# Patient Record
Sex: MALE | Race: WHITE | NOT HISPANIC OR LATINO | Employment: FULL TIME | URBAN - METROPOLITAN AREA
[De-identification: names, ages, dates, MRNs, and addresses within clinical notes are randomized per-mention and may not be internally consistent; named-entity substitution may affect disease eponyms.]

---

## 2019-04-29 ENCOUNTER — APPOINTMENT (OUTPATIENT)
Dept: RADIOLOGY | Facility: IMAGING CENTER | Age: 49
End: 2019-04-29
Attending: PHYSICIAN ASSISTANT
Payer: COMMERCIAL

## 2019-04-29 ENCOUNTER — OFFICE VISIT (OUTPATIENT)
Dept: URGENT CARE | Facility: PHYSICIAN GROUP | Age: 49
End: 2019-04-29
Payer: COMMERCIAL

## 2019-04-29 VITALS
RESPIRATION RATE: 18 BRPM | HEART RATE: 80 BPM | WEIGHT: 188 LBS | OXYGEN SATURATION: 90 % | DIASTOLIC BLOOD PRESSURE: 82 MMHG | HEIGHT: 67 IN | SYSTOLIC BLOOD PRESSURE: 130 MMHG | BODY MASS INDEX: 29.51 KG/M2 | TEMPERATURE: 98.3 F

## 2019-04-29 DIAGNOSIS — R06.2 WHEEZING: ICD-10-CM

## 2019-04-29 DIAGNOSIS — J22 LOWER RESP. TRACT INFECTION: ICD-10-CM

## 2019-04-29 DIAGNOSIS — R06.02 SOB (SHORTNESS OF BREATH): ICD-10-CM

## 2019-04-29 DIAGNOSIS — R79.81 LOW OXYGEN SATURATION: ICD-10-CM

## 2019-04-29 PROCEDURE — 99204 OFFICE O/P NEW MOD 45 MIN: CPT | Performed by: PHYSICIAN ASSISTANT

## 2019-04-29 PROCEDURE — 71046 X-RAY EXAM CHEST 2 VIEWS: CPT | Mod: TC,FY | Performed by: PHYSICIAN ASSISTANT

## 2019-04-29 RX ORDER — IPRATROPIUM BROMIDE AND ALBUTEROL SULFATE 2.5; .5 MG/3ML; MG/3ML
3 SOLUTION RESPIRATORY (INHALATION) ONCE
Status: COMPLETED | OUTPATIENT
Start: 2019-04-29 | End: 2019-04-29

## 2019-04-29 RX ORDER — PREDNISONE 10 MG/1
TABLET ORAL
Qty: 1 QUANTITY SUFFICIENT | Refills: 0 | Status: SHIPPED | OUTPATIENT
Start: 2019-04-29 | End: 2021-03-02

## 2019-04-29 RX ADMIN — IPRATROPIUM BROMIDE AND ALBUTEROL SULFATE 3 ML: 2.5; .5 SOLUTION RESPIRATORY (INHALATION) at 12:05

## 2019-04-29 NOTE — LETTER
April 29, 2019         Patient: Hasmukh Keating   YOB: 1970   Date of Visit: 4/29/2019           To Whom it May Concern:    Hasmukh Keating was seen in my clinic on 4/29/2019. He may return to work on 4/30/19.    If you have any questions or concerns, please don't hesitate to call.        Sincerely,           Tere Biggs P.A.-C.  Electronically Signed

## 2019-04-29 NOTE — PROGRESS NOTES
Chief Complaint   Patient presents with   • Cough     x 8 days   • Congestion       HISTORY OF PRESENT ILLNESS: Patient is a 48 y.o. male who presents today for the following:    Patient comes in for evaluation of cough and shortness of breath that started about 10 days ago.  He reports mild nasal congestion but his biggest complaint is difficulty breathing.  He denies history of asthma or COPD.  He has been smoking a pack a day for most of his adult life and quit about a month ago.  He denies fever, night was, chills, body aches.  He does not use any prescription medication.  He does not currently have a primary care provider.    There are no active problems to display for this patient.      Allergies:Patient has no known allergies.    Current Outpatient Prescriptions Ordered in [facility]   Medication Sig Dispense Refill   • predniSONE (DELTASONE) 10 MG Tab 50 mg x 1 day; 40 mg x 1 day; 30 mg x 1 day; 20 mg x 1 day; 10 mg x 1 day 1 Quantity Sufficient 0     No current Epic-ordered facility-administered medications on file.        No past medical history on file.    Social History   Substance Use Topics   • Smoking status: Former Smoker     Packs/day: 1.00     Types: Cigarettes     Quit date: 1930   • Smokeless tobacco: Never Used   • Alcohol use No      Comment: occ       No family status information on file.   No family history on file.    Review of Systems:   Constitutional ROS: No unexpected change in weight, No weakness, No fatigue  Eye ROS: No recent significant change in vision, No eye pain, redness, discharge  Ear ROS: No drainage, No tinnitus or vertigo, No recent change in hearing  Mouth/Throat ROS: No teeth or gum problems, No bleeding gums, No tongue complaints  Neck ROS: No swollen glands, No significant pain in neck  Pulmonary ROS: Positive for shortness of breath.  Cardiovascular ROS: No diaphoresis, No edema, No palpitations  Gastrointestinal ROS: No change in bowel habits, No significant change in  "appetite, No nausea, vomiting, diarrhea, or constipation  Musculoskeletal/Extremities ROS: No peripheral edema, No pain, redness or swelling on the joints  Hematologic/Lymphatic ROS: No chills, No night sweats, No weight loss  Skin/Integumentary ROS: No edema, No evidence of rash, No itching      Exam:  /82   Pulse 80   Temp 36.8 °C (98.3 °F) (Temporal)   Resp 18   Ht 1.702 m (5' 7\")   Wt 85.3 kg (188 lb)   SpO2 90%   General: Well developed, well nourished. No distress.  Eye: PERRL/EOMI; conjunctivae clear, lids normal.  ENMT: Lips without lesions, MMM. Oropharynx is clear. Bilateral TMs are within normal limits.  Pulmonary: Unlabored respiratory effort.  Decreased breath sounds throughout with faint inspiratory and expiratory wheezes.  Cardiovascular: Regular rate and rhythm without murmur.    Neurologic: Grossly nonfocal. No facial asymmetry noted.  Lymph: No cervical lymphadenopathy noted.  Skin: Warm, dry, good turgor. No rashes in visible areas.   Psych: Normal mood. Alert and oriented x3. Judgment and insight is normal.    DuoNeb x1: Patient is feeling a little bit better post treatment.  Breath sounds have increased but continues to have expiratory and expiratory wheezes.  Oxygenating at 93% on room air.    Chest x-ray, per radiology:  Impression       1.  There is mild central bronchial wall thickening which can be associated with underlying asthma or bronchitis.  2.  There is no evidence of focal pneumonia or edema.     Assessment/Plan:  Discussed likely viral etiology versus seasonal allergies.  No signs of pneumonia on x-ray.  Use all medication as directed.  Follow up for worsening or persistent symptoms.  1. Lower resp. tract infection     2. SOB (shortness of breath)  DX-CHEST-2 VIEWS    ipratropium-albuterol (DUONEB) nebulizer solution    predniSONE (DELTASONE) 10 MG Tab   3. Low oxygen saturation  DX-CHEST-2 VIEWS    ipratropium-albuterol (DUONEB) nebulizer solution    predniSONE " (DELTASONE) 10 MG Tab   4. Wheezing  ipratropium-albuterol (DUONEB) nebulizer solution    predniSONE (DELTASONE) 10 MG Tab

## 2020-08-12 ENCOUNTER — OFFICE VISIT (OUTPATIENT)
Dept: URGENT CARE | Facility: PHYSICIAN GROUP | Age: 50
End: 2020-08-12
Payer: COMMERCIAL

## 2020-08-12 VITALS
TEMPERATURE: 97.7 F | RESPIRATION RATE: 16 BRPM | HEART RATE: 84 BPM | BODY MASS INDEX: 30.23 KG/M2 | SYSTOLIC BLOOD PRESSURE: 140 MMHG | WEIGHT: 193 LBS | OXYGEN SATURATION: 94 % | DIASTOLIC BLOOD PRESSURE: 80 MMHG

## 2020-08-12 DIAGNOSIS — B02.9 HERPES ZOSTER WITHOUT COMPLICATION: ICD-10-CM

## 2020-08-12 DIAGNOSIS — R10.9 LEFT FLANK PAIN: ICD-10-CM

## 2020-08-12 LAB
APPEARANCE UR: NORMAL
BILIRUB UR STRIP-MCNC: NORMAL MG/DL
COLOR UR AUTO: YELLOW
GLUCOSE UR STRIP.AUTO-MCNC: NORMAL MG/DL
KETONES UR STRIP.AUTO-MCNC: NORMAL MG/DL
LEUKOCYTE ESTERASE UR QL STRIP.AUTO: NORMAL
NITRITE UR QL STRIP.AUTO: NORMAL
PH UR STRIP.AUTO: 7 [PH] (ref 5–8)
PROT UR QL STRIP: NORMAL MG/DL
RBC UR QL AUTO: NORMAL
SP GR UR STRIP.AUTO: 1.02
UROBILINOGEN UR STRIP-MCNC: 0.2 MG/DL

## 2020-08-12 PROCEDURE — 99214 OFFICE O/P EST MOD 30 MIN: CPT | Performed by: PHYSICIAN ASSISTANT

## 2020-08-12 PROCEDURE — 81002 URINALYSIS NONAUTO W/O SCOPE: CPT | Performed by: PHYSICIAN ASSISTANT

## 2020-08-12 RX ORDER — VALACYCLOVIR HYDROCHLORIDE 1 G/1
1000 TABLET, FILM COATED ORAL 3 TIMES DAILY
Qty: 21 TAB | Refills: 0 | Status: SHIPPED | OUTPATIENT
Start: 2020-08-12 | End: 2020-08-19

## 2020-08-12 ASSESSMENT — ENCOUNTER SYMPTOMS
SHORTNESS OF BREATH: 0
FEVER: 0
COUGH: 0
VOMITING: 0
FATIGUE: 0
RHINORRHEA: 0
BACK PAIN: 1
CHILLS: 0
SORE THROAT: 0
DIARRHEA: 0

## 2020-08-12 NOTE — PROGRESS NOTES
"Subjective:      Hasmukh Keating is a 50 y.o. male who presents with Flank Pain (L side pain x6d )            Patient is a pleasant 50-year-old male who presents to urgent care with left flank pain for the last 5 to 6 days.  He reports that the pain began to his left side with radiation to his back however will localizes more to his back.  He denies specific fall, trauma or injury, urinary symptoms, nausea vomiting or diarrhea.  He then became more concerned when he noticed rash developed to his back that was painful and slightly itchy in nature.  He believes this to be \"mosquito bites \"however this is more painful than such. He has not taken anything for his symptoms.   He does have hx of chickenpox in the past.     Rash  This is a new problem. Episode onset: 2-3 days ago. The problem is unchanged. Location: Left flank. The rash is characterized by blistering, pain and redness. He was exposed to nothing. Pertinent negatives include no congestion, cough, diarrhea, fatigue, fever, rhinorrhea, shortness of breath, sore throat or vomiting. Past treatments include nothing.       Review of Systems   Constitutional: Negative for chills, fatigue, fever and malaise/fatigue.   HENT: Negative for congestion, rhinorrhea and sore throat.    Respiratory: Negative for cough and shortness of breath.    Gastrointestinal: Negative for diarrhea and vomiting.   Musculoskeletal: Positive for back pain.   Skin: Positive for itching and rash.   All other systems reviewed and are negative.         Objective:     /80   Pulse 84   Temp 36.5 °C (97.7 °F) (Temporal)   Resp 16   Wt 87.5 kg (193 lb)   SpO2 94%   BMI 30.23 kg/m²    PMH:  has no past medical history on file.  MEDS: Reviewed .   ALLERGIES: No Known Allergies  SURGHX:   Past Surgical History:   Procedure Laterality Date   • STUMP REVISION  6/21/2013    Performed by Shaji Moore M.D. at SURGERY [facility]     SOCHX:  reports that he quit smoking about 90 years " ago. His smoking use included cigarettes. He smoked 1.00 pack per day. He has never used smokeless tobacco. He reports that he does not drink alcohol or use drugs.  FH: Family history was reviewed, no pertinent findings to report    Physical Exam  Vitals signs reviewed.   Constitutional:       General: He is not in acute distress.     Appearance: He is well-developed.   HENT:      Head: Normocephalic and atraumatic.      Right Ear: External ear normal.      Left Ear: External ear normal.      Mouth/Throat:      Pharynx: No oropharyngeal exudate.   Eyes:      Conjunctiva/sclera: Conjunctivae normal.      Pupils: Pupils are equal, round, and reactive to light.   Neck:      Musculoskeletal: Normal range of motion and neck supple.      Trachea: No tracheal deviation.   Cardiovascular:      Rate and Rhythm: Normal rate and regular rhythm.      Heart sounds: No murmur.   Pulmonary:      Effort: Pulmonary effort is normal. No respiratory distress.      Breath sounds: Normal breath sounds.   Abdominal:      Tenderness: There is no abdominal tenderness.   Musculoskeletal: Normal range of motion.   Skin:     General: Skin is warm.      Findings: Rash present.             Comments: 3 discrete patches to left back/flank- erythematous and vesicular in nature- tenderness noted.      Neurological:      Mental Status: He is alert and oriented to person, place, and time.      Coordination: Coordination normal.   Psychiatric:         Behavior: Behavior normal.         Thought Content: Thought content normal.         Judgment: Judgment normal.                 Assessment/Plan:        1. Herpes zoster without complication  - valacyclovir (VALTREX) 1 GM Tab; Take 1 Tab by mouth 3 times a day for 7 days.  Dispense: 21 Tab; Refill: 0    2. Left flank pain  - POCT Urinalysis    UA- clear.   Pt is with classic zoster rash- with associated dermatomal pain.   Will start the patient on the above at this time as patient would like to trial such.   Discussed the contagious nature of symptoms today in certain populations to avoid.  He is to keep the area clean and dry.  Patient and/or guardian given precautionary s/sx that mandate immediate follow up and evaluation in the ED. Advised of risks of not doing so.  Side effects of the above medications discussed.   DDX, Supportive care, and indications for immediate follow-up discussed with patient.    Instructed to return to clinic or nearest emergency department if we are not available for any change in condition, further concerns, or worsening of symptoms.    The patient and/or guardian demonstrated a good understanding and agreed with the treatment plan.    Please note that this dictation was created using voice recognition software. I have made every reasonable attempt to correct obvious errors, but I expect that there are errors of grammar and possibly content that I did not discover before finalizing the note.

## 2021-03-02 ENCOUNTER — OFFICE VISIT (OUTPATIENT)
Dept: URGENT CARE | Facility: PHYSICIAN GROUP | Age: 51
End: 2021-03-02

## 2021-03-02 VITALS
BODY MASS INDEX: 29.82 KG/M2 | OXYGEN SATURATION: 94 % | RESPIRATION RATE: 12 BRPM | WEIGHT: 190 LBS | HEIGHT: 67 IN | DIASTOLIC BLOOD PRESSURE: 90 MMHG | HEART RATE: 77 BPM | SYSTOLIC BLOOD PRESSURE: 152 MMHG | TEMPERATURE: 98.4 F

## 2021-03-02 DIAGNOSIS — Z02.4 ENCOUNTER FOR CDL (COMMERCIAL DRIVING LICENSE) EXAM: ICD-10-CM

## 2021-03-02 PROCEDURE — 7100 PR DOT PHYSICAL: Performed by: PHYSICIAN ASSISTANT

## 2021-03-03 NOTE — PROGRESS NOTES
Patient is here for a CDL exam.  Patient takes no prescription medication.  Patient denies chest pain, shortness of breath, orthopnea, PND, leg swelling.  All paperwork was reviewed.  Exam is normal.  Cleared for 1 year due to elevated blood pressure.  Patient has no history of hypertension.  Discussed lifestyle changes that could improve his blood pressure.

## 2022-03-24 ENCOUNTER — APPOINTMENT (OUTPATIENT)
Dept: RADIOLOGY | Facility: IMAGING CENTER | Age: 52
End: 2022-03-24
Attending: FAMILY MEDICINE
Payer: COMMERCIAL

## 2022-03-24 ENCOUNTER — OFFICE VISIT (OUTPATIENT)
Dept: URGENT CARE | Facility: PHYSICIAN GROUP | Age: 52
End: 2022-03-24
Payer: COMMERCIAL

## 2022-03-24 VITALS
TEMPERATURE: 97.8 F | DIASTOLIC BLOOD PRESSURE: 64 MMHG | RESPIRATION RATE: 16 BRPM | OXYGEN SATURATION: 94 % | HEIGHT: 67 IN | HEART RATE: 90 BPM | SYSTOLIC BLOOD PRESSURE: 152 MMHG | WEIGHT: 189 LBS | BODY MASS INDEX: 29.66 KG/M2

## 2022-03-24 DIAGNOSIS — S20.211A CONTUSION OF RIGHT CHEST WALL, INITIAL ENCOUNTER: ICD-10-CM

## 2022-03-24 PROCEDURE — 99213 OFFICE O/P EST LOW 20 MIN: CPT | Performed by: FAMILY MEDICINE

## 2022-03-24 PROCEDURE — 71101 X-RAY EXAM UNILAT RIBS/CHEST: CPT | Mod: TC,FY,RT | Performed by: RADIOLOGY

## 2022-03-24 RX ORDER — NAPROXEN 500 MG/1
500 TABLET ORAL 2 TIMES DAILY WITH MEALS
Qty: 20 TABLET | Refills: 0 | Status: SHIPPED | OUTPATIENT
Start: 2022-03-24 | End: 2022-04-03

## 2022-03-24 ASSESSMENT — ENCOUNTER SYMPTOMS
NAUSEA: 0
MYALGIAS: 0
FEVER: 0
VOMITING: 0
COUGH: 0
SHORTNESS OF BREATH: 0
DIZZINESS: 0
CHILLS: 0
SORE THROAT: 0

## 2022-03-24 ASSESSMENT — PAIN SCALES - GENERAL: PAINLEVEL: 4=SLIGHT-MODERATE PAIN

## 2022-03-24 NOTE — PROGRESS NOTES
Subjective:   Hasmukh Keating is a 51 y.o. male who presents for Rib Injury (R side, x4days )        Chest Pain   Chronicity: Reports right lateral chest wall pain, onset 4 days prior after striking the right ribs and chest wall on a gearshift. The current episode started in the past 7 days (4 days prior). The onset quality is sudden. The problem occurs constantly. The problem has been unchanged. The pain is moderate. The quality of the pain is described as sharp. Pertinent negatives include no cough, dizziness, fever, nausea, shortness of breath or vomiting. Associated symptoms comments: Reports exacerbation of pain with deep inspiration and direct palpation. He has tried rest for the symptoms. The treatment provided no relief.     PMH:  has no past medical history on file.  MEDS:   Current Outpatient Medications:   •  naproxen (NAPROSYN) 500 MG Tab, Take 1 Tablet by mouth 2 times a day with meals for 10 days., Disp: 20 Tablet, Rfl: 0  ALLERGIES: No Known Allergies  SURGHX:   Past Surgical History:   Procedure Laterality Date   • STUMP REVISION  6/21/2013    Performed by Shaji Moore M.D. at SURGERY [facility]     SOCHX:  reports that he quit smoking about 92 years ago. His smoking use included cigarettes. He smoked 1.00 pack per day. He has never used smokeless tobacco. He reports that he does not drink alcohol and does not use drugs.  FH: History reviewed. No pertinent family history.  Review of Systems   Constitutional: Negative for chills and fever.   HENT: Negative for sore throat.    Respiratory: Negative for cough and shortness of breath.    Cardiovascular: Positive for chest pain (Right chest wall pain).   Gastrointestinal: Negative for nausea and vomiting.   Musculoskeletal: Negative for myalgias.   Skin: Negative for rash.   Neurological: Negative for dizziness.   All other systems reviewed and are negative.       Objective:   /64   Pulse 90   Temp 36.6 °C (97.8 °F) (Temporal)   Resp  "16   Ht 1.702 m (5' 7\")   Wt 85.7 kg (189 lb)   SpO2 94%   BMI 29.60 kg/m²   Physical Exam  Vitals and nursing note reviewed.   Constitutional:       General: He is not in acute distress.     Appearance: He is well-developed.   HENT:      Head: Normocephalic and atraumatic.      Right Ear: External ear normal.      Left Ear: External ear normal.      Nose: Nose normal.      Mouth/Throat:      Mouth: Mucous membranes are moist.   Eyes:      Conjunctiva/sclera: Conjunctivae normal.   Cardiovascular:      Rate and Rhythm: Normal rate.   Pulmonary:      Effort: Pulmonary effort is normal. No respiratory distress.      Breath sounds: Wheezing (Mild expiratory) present. No rhonchi.   Chest:       Abdominal:      General: There is no distension.   Musculoskeletal:         General: Normal range of motion.   Skin:     General: Skin is warm and dry.   Neurological:      General: No focal deficit present.      Mental Status: He is alert and oriented to person, place, and time. Mental status is at baseline.      Gait: Gait (gait at baseline) normal.   Psychiatric:         Judgment: Judgment normal.     [id number] (WITH 1-VIEW CXR) RIGHT  Order: [id number]   Status: Final result     Visible to patient: No (scheduled for 3/25/2022  1:20 PM)     Dx: Contusion of right chest wall, initia...     0 Result Notes    Details    Reading Physician Reading Date Result Priority   Eve Wang M.D.  [phone] 3/24/2022 Urgent Care     Narrative & Impression     3/24/2022 3:02 PM     HISTORY/REASON FOR EXAM:  Right lateral upper mid rib pain after injury 2 days ago.        TECHNIQUE/EXAM DESCRIPTION AND NUMBER OF VIEWS:  One view of the chest and 5 images of the right ribs.     COMPARISON: CXR 4/29/2019     FINDINGS:  No displaced fractures or acute bony changes are noted.     There is no pneumothorax or hemothorax.        IMPRESSION:     1.  Unremarkable right rib series.             Exam Ended: 03/24/22  3:16 PM Last " Resulted: 03/24/22  3:18 PM                 Assessment/Plan:   1. Contusion of right chest wall, initial encounter  - [id number] (WITH 1-VIEW CXR) RIGHT; Future  - naproxen (NAPROSYN) 500 MG Tab; Take 1 Tablet by mouth 2 times a day with meals for 10 days.  Dispense: 20 Tablet; Refill: 0        Medical Decision Making/Course:  In the course of preparing for this visit with review of the pertinent past medical history, recent and past clinic visits, current medications, and performing chart, immunization, medical history and medication reconciliation, and in the further course of obtaining the current history pertinent to the clinic visit today, performing an exam and evaluation, ordering and independently evaluating labs, tests including interpretation of XRAY imaging , and/or procedures, prescribing any recommended new medications as noted above, providing any pertinent counseling and education and recommending further coordination of care, at least  30 minutes of total time were spent during this encounter.      Discussed close monitoring, return precautions, and supportive measures of maintaining adequate fluid hydration and caloric intake, relative rest and symptom management as needed for pain and/or fever.    Differential diagnosis, natural history, supportive care, and indications for immediate follow-up discussed.     Advised the patient to follow-up with the primary care physician for recheck, reevaluation, and consideration of further management.    Please note that this dictation was created using voice recognition software. I have worked with consultants from the vendor as well as technical experts from [email or web address] [employer] to optimize the interface. I have made every reasonable attempt to correct obvious errors, but I expect that there are errors of grammar and possibly content that I did not discover before finalizing the note.

## 2022-12-14 ENCOUNTER — OFFICE VISIT (OUTPATIENT)
Dept: URGENT CARE | Facility: PHYSICIAN GROUP | Age: 52
End: 2022-12-14
Payer: COMMERCIAL

## 2022-12-14 VITALS
TEMPERATURE: 97.6 F | RESPIRATION RATE: 18 BRPM | WEIGHT: 176.6 LBS | SYSTOLIC BLOOD PRESSURE: 132 MMHG | DIASTOLIC BLOOD PRESSURE: 92 MMHG | HEIGHT: 67 IN | OXYGEN SATURATION: 94 % | BODY MASS INDEX: 27.72 KG/M2 | HEART RATE: 78 BPM

## 2022-12-14 DIAGNOSIS — J02.9 PHARYNGITIS, UNSPECIFIED ETIOLOGY: ICD-10-CM

## 2022-12-14 DIAGNOSIS — J22 LRTI (LOWER RESPIRATORY TRACT INFECTION): ICD-10-CM

## 2022-12-14 DIAGNOSIS — R06.02 SHORTNESS OF BREATH: ICD-10-CM

## 2022-12-14 DIAGNOSIS — R06.2 WHEEZING: ICD-10-CM

## 2022-12-14 LAB
EXTERNAL QUALITY CONTROL: NORMAL
FLUAV+FLUBV AG SPEC QL IA: NEGATIVE
INT CON NEG: NORMAL
INT CON POS: NORMAL
S PYO AG THROAT QL: NEGATIVE
SARS-COV+SARS-COV-2 AG RESP QL IA.RAPID: NEGATIVE

## 2022-12-14 PROCEDURE — 87804 INFLUENZA ASSAY W/OPTIC: CPT | Performed by: NURSE PRACTITIONER

## 2022-12-14 PROCEDURE — 87426 SARSCOV CORONAVIRUS AG IA: CPT | Performed by: NURSE PRACTITIONER

## 2022-12-14 PROCEDURE — 94640 AIRWAY INHALATION TREATMENT: CPT | Performed by: NURSE PRACTITIONER

## 2022-12-14 PROCEDURE — 87880 STREP A ASSAY W/OPTIC: CPT | Performed by: NURSE PRACTITIONER

## 2022-12-14 PROCEDURE — 99213 OFFICE O/P EST LOW 20 MIN: CPT | Mod: 25 | Performed by: NURSE PRACTITIONER

## 2022-12-14 RX ORDER — PREDNISONE 20 MG/1
20 TABLET ORAL 2 TIMES DAILY
Qty: 10 TABLET | Refills: 0 | Status: SHIPPED | OUTPATIENT
Start: 2022-12-14 | End: 2022-12-19

## 2022-12-14 RX ORDER — ALBUTEROL SULFATE 90 UG/1
2 AEROSOL, METERED RESPIRATORY (INHALATION) EVERY 4 HOURS PRN
Qty: 1 EACH | Refills: 0 | Status: SHIPPED | OUTPATIENT
Start: 2022-12-14 | End: 2022-12-28

## 2022-12-14 RX ORDER — IPRATROPIUM BROMIDE AND ALBUTEROL SULFATE 2.5; .5 MG/3ML; MG/3ML
3 SOLUTION RESPIRATORY (INHALATION) ONCE
Status: COMPLETED | OUTPATIENT
Start: 2022-12-14 | End: 2022-12-14

## 2022-12-14 RX ORDER — DOXYCYCLINE HYCLATE 100 MG
100 TABLET ORAL 2 TIMES DAILY
Qty: 14 TABLET | Refills: 0 | Status: SHIPPED | OUTPATIENT
Start: 2022-12-14 | End: 2022-12-21

## 2022-12-14 RX ADMIN — IPRATROPIUM BROMIDE AND ALBUTEROL SULFATE 3 ML: 2.5; .5 SOLUTION RESPIRATORY (INHALATION) at 18:41

## 2022-12-14 ASSESSMENT — ENCOUNTER SYMPTOMS
WHEEZING: 1
ABDOMINAL PAIN: 0
SPUTUM PRODUCTION: 1
NAUSEA: 0
COUGH: 1
DIARRHEA: 0
SHORTNESS OF BREATH: 1
VOMITING: 0
HEADACHES: 1

## 2022-12-14 ASSESSMENT — COPD QUESTIONNAIRES: COPD: 0

## 2022-12-15 NOTE — PROGRESS NOTES
"Subjective:     Hasmukh Keating is a 52 y.o. male who presents for Shortness of Breath (X 5-6 days), Cough (X 12 days), and Headache      Shortness of Breath  This is a new problem. Episode onset: Hasmukh is a pleasant 52 year old male who presents to  today with complaints of cough and congestion for the past 12 days. In the past 5 days he has been experiencing shortness of breath. Associated symptoms include headaches, sputum production and wheezing. Pertinent negatives include no abdominal pain or vomiting. Treatments tried: Dayquil/theraflu. There is no history of asthma, COPD or pneumonia.   Headache      Review of Systems   Constitutional:  Positive for malaise/fatigue.   HENT:  Positive for congestion.    Respiratory:  Positive for cough, sputum production, shortness of breath and wheezing.    Gastrointestinal:  Negative for abdominal pain, diarrhea, nausea and vomiting.   Neurological:  Positive for headaches.     PMH: No past medical history on file.  ALLERGIES: No Known Allergies  SURGHX:   Past Surgical History:   Procedure Laterality Date    STUMP REVISION  6/21/2013    Performed by Shaji Moore M.D. at SURGERY [facility]     SOCHX:   Social History     Socioeconomic History    Marital status: Single   Tobacco Use    Smoking status: Former     Packs/day: 1.00     Types: Cigarettes    Smokeless tobacco: Never   Substance and Sexual Activity    Alcohol use: No     Comment: occ    Drug use: No     FH: No family history on file.      Objective:   BP (!) 132/92   Pulse 78   Temp 36.4 °C (97.6 °F) (Temporal)   Resp 18   Ht 1.702 m (5' 7\")   Wt 80.1 kg (176 lb 9.6 oz)   SpO2 94%   BMI 27.66 kg/m²     Physical Exam  Vitals and nursing note reviewed.   Constitutional:       General: He is not in acute distress.     Appearance: Normal appearance. He is obese. He is not ill-appearing.   HENT:      Head: Normocephalic and atraumatic.      Right Ear: Tympanic membrane, ear canal and external ear " normal. There is no impacted cerumen.      Left Ear: Tympanic membrane, ear canal and external ear normal. There is no impacted cerumen.      Nose: Congestion present. No rhinorrhea.      Mouth/Throat:      Mouth: Mucous membranes are moist.      Pharynx: Posterior oropharyngeal erythema present. No oropharyngeal exudate.   Eyes:      Extraocular Movements: Extraocular movements intact.      Pupils: Pupils are equal, round, and reactive to light.   Cardiovascular:      Rate and Rhythm: Normal rate and regular rhythm.      Pulses: Normal pulses.      Heart sounds: Normal heart sounds.   Pulmonary:      Effort: Pulmonary effort is normal.      Breath sounds: Examination of the right-upper field reveals wheezing. Examination of the left-upper field reveals wheezing. Examination of the right-middle field reveals wheezing. Examination of the left-middle field reveals wheezing. Examination of the right-lower field reveals wheezing. Examination of the left-lower field reveals wheezing. Wheezing present. No decreased breath sounds.      Comments: Duoneb treatment given in clinic. His lungs remain wheezing on auscultation following treatment.   Abdominal:      General: Abdomen is flat. Bowel sounds are normal.      Palpations: Abdomen is soft.      Tenderness: There is no abdominal tenderness. There is no right CVA tenderness or left CVA tenderness.   Musculoskeletal:         General: Normal range of motion.      Cervical back: Normal range of motion and neck supple.   Skin:     General: Skin is warm and dry.      Capillary Refill: Capillary refill takes less than 2 seconds.   Neurological:      General: No focal deficit present.      Mental Status: He is alert and oriented to person, place, and time. Mental status is at baseline.   Psychiatric:         Mood and Affect: Mood normal.         Behavior: Behavior normal.         Thought Content: Thought content normal.         Judgment: Judgment normal.     Results for orders  placed or performed in visit on 12/14/22   POCT Rapid Strep A   Result Value Ref Range    Rapid Strep Screen negative     Internal Control Positive Valid     Internal Control Negative Valid    POCT SARS-COV Antigen DASIA (Symptomatic only)   Result Value Ref Range    Internal  Valid     SARS-COV ANTIGEN DASIA Negative Negative, Indeterminate, None Detected, Not Detected, Detected, NotDetected, Valid, Invalid, Pass    Internal Control Positive Valid     Internal Control Negative Valid    POCT Influenza A/B   Result Value Ref Range    Rapid Influenza A-B negative     Internal Control Positive Valid     Internal Control Negative Valid        Assessment/Plan:   Assessment    1. Shortness of breath  ipratropium-albuterol (DUONEB) nebulizer solution    albuterol 108 (90 Base) MCG/ACT Aero Soln inhalation aerosol    POCT SARS-COV Antigen DASIA (Symptomatic only)    POCT Influenza A/B      2. LRTI (lower respiratory tract infection)  ipratropium-albuterol (DUONEB) nebulizer solution    albuterol 108 (90 Base) MCG/ACT Aero Soln inhalation aerosol    predniSONE (DELTASONE) 20 MG Tab    doxycycline (VIBRAMYCIN) 100 MG Tab    POCT SARS-COV Antigen DASIA (Symptomatic only)    POCT Influenza A/B      3. Wheezing  albuterol 108 (90 Base) MCG/ACT Aero Soln inhalation aerosol    predniSONE (DELTASONE) 20 MG Tab    POCT SARS-COV Antigen DASIA (Symptomatic only)    POCT Influenza A/B      4. Pharyngitis, unspecified etiology  POCT Rapid Strep A      Prednisone, Doxycycline and albuterol to be taken as directed.   We discussed supportive measures including humidifier, warm salt water gargles, over-the-counter Cepacol throat lozenges, rest  and increased fluids. Pt was encouraged to seek treatment back in the ER or urgent care for worsening symptoms,  fever greater than 100.5, wheezes or shortness of breath.